# Patient Record
Sex: MALE | Race: WHITE | URBAN - METROPOLITAN AREA
[De-identification: names, ages, dates, MRNs, and addresses within clinical notes are randomized per-mention and may not be internally consistent; named-entity substitution may affect disease eponyms.]

---

## 2022-04-29 ENCOUNTER — TELEPHONE ENCOUNTER (OUTPATIENT)
Dept: URBAN - METROPOLITAN AREA CLINIC 118 | Facility: CLINIC | Age: 57
End: 2022-04-29

## 2022-05-02 ENCOUNTER — WEB ENCOUNTER (OUTPATIENT)
Dept: URBAN - METROPOLITAN AREA CLINIC 118 | Facility: CLINIC | Age: 57
End: 2022-05-02

## 2022-05-06 ENCOUNTER — OFFICE VISIT (OUTPATIENT)
Dept: URBAN - METROPOLITAN AREA CLINIC 109 | Facility: CLINIC | Age: 57
End: 2022-05-06
Payer: COMMERCIAL

## 2022-05-06 VITALS
BODY MASS INDEX: 41.2 KG/M2 | HEIGHT: 72 IN | HEART RATE: 74 BPM | TEMPERATURE: 97.3 F | DIASTOLIC BLOOD PRESSURE: 82 MMHG | WEIGHT: 304.2 LBS | SYSTOLIC BLOOD PRESSURE: 127 MMHG

## 2022-05-06 DIAGNOSIS — K62.5 BRIGHT RED RECTAL BLEEDING: ICD-10-CM

## 2022-05-06 DIAGNOSIS — K64.1 GRADE II INTERNAL HEMORRHOIDS: ICD-10-CM

## 2022-05-06 DIAGNOSIS — Z86.010 PERSONAL HISTORY OF COLON POLYPS: ICD-10-CM

## 2022-05-06 PROCEDURE — 46221 LIGATION OF HEMORRHOID(S): CPT | Performed by: INTERNAL MEDICINE

## 2022-05-06 PROCEDURE — 99213 OFFICE O/P EST LOW 20 MIN: CPT | Performed by: INTERNAL MEDICINE

## 2022-05-06 RX ORDER — LISINOPRIL 20 MG/1
TABLET ORAL
Qty: 0 | Refills: 0 | Status: ACTIVE | COMMUNITY
Start: 1900-01-01

## 2022-05-06 RX ORDER — PRASUGREL 10 MG/1
TAKE 1 TABLET (10 MG) BY ORAL ROUTE ONCE DAILY TABLET, FILM COATED ORAL 1
Qty: 0 | Refills: 0 | Status: DISCONTINUED | COMMUNITY
Start: 1900-01-01

## 2022-05-06 RX ORDER — ASPIRIN 81 MG/1
TABLET, COATED ORAL
Qty: 0 | Refills: 0 | Status: ACTIVE | COMMUNITY
Start: 1900-01-01

## 2022-05-06 RX ORDER — ATORVASTATIN CALCIUM 40 MG/1
TABLET, FILM COATED ORAL
Qty: 0 | Refills: 0 | Status: ACTIVE | COMMUNITY
Start: 1900-01-01

## 2022-05-06 RX ORDER — METOPROLOL TARTRATE 25 MG/1
TABLET, FILM COATED ORAL
Qty: 0 | Refills: 0 | Status: ACTIVE | COMMUNITY
Start: 1900-01-01

## 2022-05-06 RX ORDER — LEVOTHYROXINE SODIUM 0.1 MG/1
TABLET ORAL
Qty: 0 | Refills: 0 | Status: ACTIVE | COMMUNITY
Start: 1900-01-01

## 2022-05-06 NOTE — HPI-TODAY'S VISIT:
small TA 2019, repeat 2024  reports rectal bleeding for the last few days as main symptoms of the hemorrhoids, were swollen now less swollen less itching than before as well denies constipation

## 2022-05-06 NOTE — EXAM-PHYSICAL EXAM
Natasha in room as chaperone Rectal exam: small residual healing throbosed ext hemorrhoid  Anoscopy performed using self lighted anoscope Grade 2 RP, Grade 2 LL  Banded RP using [facility] system without complication after reviewing R/B/A with the patient (first two bands misfired, had to use a third)

## 2022-05-20 ENCOUNTER — CLAIMS CREATED FROM THE CLAIM WINDOW (OUTPATIENT)
Dept: URBAN - METROPOLITAN AREA CLINIC 109 | Facility: CLINIC | Age: 57
End: 2022-05-20
Payer: COMMERCIAL

## 2022-05-20 VITALS
TEMPERATURE: 97.5 F | DIASTOLIC BLOOD PRESSURE: 92 MMHG | HEIGHT: 72 IN | BODY MASS INDEX: 40.8 KG/M2 | WEIGHT: 301.2 LBS | SYSTOLIC BLOOD PRESSURE: 101 MMHG | HEART RATE: 74 BPM

## 2022-05-20 DIAGNOSIS — Z86.010 ADENOMAS PERSONAL HISTORY OF COLONIC POLYPS: ICD-10-CM

## 2022-05-20 DIAGNOSIS — K64.1 GRADE II INTERNAL HEMORRHOIDS: ICD-10-CM

## 2022-05-20 DIAGNOSIS — K62.5 BRIGHT RED RECTAL BLEEDING: ICD-10-CM

## 2022-05-20 PROCEDURE — 99213 OFFICE O/P EST LOW 20 MIN: CPT | Performed by: INTERNAL MEDICINE

## 2022-05-20 PROCEDURE — 46221 LIGATION OF HEMORRHOID(S): CPT | Performed by: INTERNAL MEDICINE

## 2022-05-20 RX ORDER — LEVOTHYROXINE SODIUM 0.1 MG/1
TABLET ORAL
Qty: 0 | Refills: 0 | Status: ACTIVE | COMMUNITY
Start: 1900-01-01

## 2022-05-20 RX ORDER — ATORVASTATIN CALCIUM 40 MG/1
TABLET, FILM COATED ORAL
Qty: 0 | Refills: 0 | Status: ACTIVE | COMMUNITY
Start: 1900-01-01

## 2022-05-20 RX ORDER — LISINOPRIL 20 MG/1
TABLET ORAL
Qty: 0 | Refills: 0 | Status: ACTIVE | COMMUNITY
Start: 1900-01-01

## 2022-05-20 RX ORDER — ASPIRIN 81 MG/1
TABLET, COATED ORAL
Qty: 0 | Refills: 0 | Status: ACTIVE | COMMUNITY
Start: 1900-01-01

## 2022-05-20 RX ORDER — METOPROLOL TARTRATE 25 MG/1
TABLET, FILM COATED ORAL
Qty: 0 | Refills: 0 | Status: ACTIVE | COMMUNITY
Start: 1900-01-01

## 2022-05-20 NOTE — HPI-TODAY'S VISIT:
here for repeat banding TO REVIEW small TA 2019, repeat 2024  reports rectal bleeding for the last few days as main symptoms of the hemorrhoids, were swollen now less swollen less itching than before as well denies constipation

## 2022-05-20 NOTE — EXAM-PHYSICAL EXAM
Natasha in room as chaperone Rectal exam: normal external exam  Anoscopy performed using self lighted anoscope Grade 2 RA, Grade 2 RP, Grade 2 LL  Banded RA and LL using [facility] [employer] system without complication after reviewing R/B/A with the patient

## 2022-06-17 ENCOUNTER — OFFICE VISIT (OUTPATIENT)
Dept: URBAN - METROPOLITAN AREA CLINIC 109 | Facility: CLINIC | Age: 57
End: 2022-06-17
Payer: COMMERCIAL

## 2022-06-17 VITALS
WEIGHT: 302.6 LBS | DIASTOLIC BLOOD PRESSURE: 78 MMHG | HEIGHT: 72 IN | BODY MASS INDEX: 40.99 KG/M2 | TEMPERATURE: 97.3 F | SYSTOLIC BLOOD PRESSURE: 115 MMHG | HEART RATE: 64 BPM

## 2022-06-17 DIAGNOSIS — K62.5 BRIGHT RED RECTAL BLEEDING: ICD-10-CM

## 2022-06-17 DIAGNOSIS — Z86.010 PERSONAL HISTORY OF COLON POLYPS: ICD-10-CM

## 2022-06-17 DIAGNOSIS — K64.1 GRADE II INTERNAL HEMORRHOIDS: ICD-10-CM

## 2022-06-17 PROCEDURE — 46221 LIGATION OF HEMORRHOID(S): CPT | Performed by: INTERNAL MEDICINE

## 2022-06-17 RX ORDER — METOPROLOL TARTRATE 25 MG/1
TABLET, FILM COATED ORAL
Qty: 0 | Refills: 0 | Status: ACTIVE | COMMUNITY
Start: 1900-01-01

## 2022-06-17 RX ORDER — ASPIRIN 81 MG/1
TABLET, COATED ORAL
Qty: 0 | Refills: 0 | Status: ACTIVE | COMMUNITY
Start: 1900-01-01

## 2022-06-17 RX ORDER — LEVOTHYROXINE SODIUM 0.1 MG/1
TABLET ORAL
Qty: 0 | Refills: 0 | Status: ACTIVE | COMMUNITY
Start: 1900-01-01

## 2022-06-17 RX ORDER — ATORVASTATIN CALCIUM 40 MG/1
TABLET, FILM COATED ORAL
Qty: 0 | Refills: 0 | Status: ACTIVE | COMMUNITY
Start: 1900-01-01

## 2022-06-17 RX ORDER — LISINOPRIL 20 MG/1
TABLET ORAL
Qty: 0 | Refills: 0 | Status: ACTIVE | COMMUNITY
Start: 1900-01-01

## 2022-06-17 NOTE — EXAM-PHYSICAL EXAM
Natasha in room as chaperone Rectal exam: normal external exam  Anoscopy performed using self lighted anoscope Grade 1 RA, Grade 2 RP, Grade 2 LL  Banded RP and LL using [facility] [employer] system without complication after reviewing R/B/A with the patient

## 2022-07-07 ENCOUNTER — TELEPHONE ENCOUNTER (OUTPATIENT)
Dept: URBAN - METROPOLITAN AREA CLINIC 109 | Facility: CLINIC | Age: 57
End: 2022-07-07

## 2022-07-07 RX ORDER — PRAMOXINE HYDROCHLORIDE 150 MG/15G
AS DIRECTED AEROSOL, FOAM RECTAL TWICE DAILY
Qty: 15 GRAM | Refills: 1 | OUTPATIENT
Start: 2022-07-08 | End: 2022-07-22

## 2022-07-15 ENCOUNTER — TELEPHONE ENCOUNTER (OUTPATIENT)
Dept: URBAN - METROPOLITAN AREA CLINIC 118 | Facility: CLINIC | Age: 57
End: 2022-07-15

## 2022-07-18 ENCOUNTER — OFFICE VISIT (OUTPATIENT)
Dept: URBAN - METROPOLITAN AREA CLINIC 118 | Facility: CLINIC | Age: 57
End: 2022-07-18

## 2022-09-08 ENCOUNTER — OFFICE VISIT (OUTPATIENT)
Dept: URBAN - METROPOLITAN AREA CLINIC 118 | Facility: CLINIC | Age: 57
End: 2022-09-08
Payer: COMMERCIAL

## 2022-09-08 ENCOUNTER — DASHBOARD ENCOUNTERS (OUTPATIENT)
Age: 57
End: 2022-09-08

## 2022-09-08 VITALS
DIASTOLIC BLOOD PRESSURE: 82 MMHG | SYSTOLIC BLOOD PRESSURE: 126 MMHG | TEMPERATURE: 97.2 F | WEIGHT: 287.4 LBS | HEART RATE: 84 BPM | HEIGHT: 72 IN | BODY MASS INDEX: 38.93 KG/M2

## 2022-09-08 DIAGNOSIS — K62.89 ANAL PAIN: ICD-10-CM

## 2022-09-08 DIAGNOSIS — K64.1 GRADE II INTERNAL HEMORRHOIDS: ICD-10-CM

## 2022-09-08 DIAGNOSIS — K62.5 BRIGHT RED RECTAL BLEEDING: ICD-10-CM

## 2022-09-08 DIAGNOSIS — Z86.010 PERSONAL HISTORY OF COLON POLYPS: ICD-10-CM

## 2022-09-08 PROBLEM — 428283002 HISTORY OF POLYP OF COLON: Status: ACTIVE | Noted: 2022-05-06

## 2022-09-08 PROCEDURE — 99213 OFFICE O/P EST LOW 20 MIN: CPT | Performed by: INTERNAL MEDICINE

## 2022-09-08 PROCEDURE — 46611 ANOSCOPY: CPT | Performed by: INTERNAL MEDICINE

## 2022-09-08 PROCEDURE — 46600 DIAGNOSTIC ANOSCOPY SPX: CPT | Performed by: INTERNAL MEDICINE

## 2022-09-08 RX ORDER — LISINOPRIL 20 MG/1
TABLET ORAL
Qty: 0 | Refills: 0 | Status: ACTIVE | COMMUNITY
Start: 1900-01-01

## 2022-09-08 RX ORDER — ATORVASTATIN CALCIUM 40 MG/1
TABLET, FILM COATED ORAL
Qty: 0 | Refills: 0 | Status: ACTIVE | COMMUNITY
Start: 1900-01-01

## 2022-09-08 RX ORDER — METOPROLOL TARTRATE 25 MG/1
TABLET, FILM COATED ORAL
Qty: 0 | Refills: 0 | Status: ACTIVE | COMMUNITY
Start: 1900-01-01

## 2022-09-08 RX ORDER — LEVOTHYROXINE SODIUM 0.1 MG/1
TABLET ORAL
Qty: 0 | Refills: 0 | Status: ACTIVE | COMMUNITY
Start: 1900-01-01

## 2022-09-08 RX ORDER — ASPIRIN 81 MG/1
TABLET, COATED ORAL
Qty: 0 | Refills: 0 | Status: ACTIVE | COMMUNITY
Start: 1900-01-01

## 2022-09-08 NOTE — EXAM-PHYSICAL EXAM
ezra in room as chaperone Rectal exam: normal external exam  Anoscopy performed using self lighted anoscope Grade 1 RP with mild thrombosis and possible LL small fissure

## 2022-09-08 NOTE — HPI-TODAY'S VISIT:
here for anal pain had banding 6/2022, bled from that, no more bleeding but now pain with bm, gave cream but he's only putting on the outsde no constipation  TO REVIEW small TA 2019, repeat 2024  reports rectal bleeding for the last few days as main symptoms of the hemorrhoids, were swollen now less swollen less itching than before as well denies constipation

## 2023-02-24 ENCOUNTER — OFFICE VISIT (OUTPATIENT)
Dept: URBAN - METROPOLITAN AREA CLINIC 109 | Facility: CLINIC | Age: 58
End: 2023-02-24